# Patient Record
Sex: FEMALE | Race: BLACK OR AFRICAN AMERICAN | NOT HISPANIC OR LATINO | Employment: STUDENT | URBAN - METROPOLITAN AREA
[De-identification: names, ages, dates, MRNs, and addresses within clinical notes are randomized per-mention and may not be internally consistent; named-entity substitution may affect disease eponyms.]

---

## 2024-08-31 ENCOUNTER — HOSPITAL ENCOUNTER (EMERGENCY)
Facility: HOSPITAL | Age: 24
Discharge: HOME | End: 2024-08-31

## 2024-08-31 VITALS
OXYGEN SATURATION: 98 % | HEART RATE: 84 BPM | SYSTOLIC BLOOD PRESSURE: 112 MMHG | BODY MASS INDEX: 24.84 KG/M2 | DIASTOLIC BLOOD PRESSURE: 78 MMHG | TEMPERATURE: 97.7 F | RESPIRATION RATE: 16 BRPM | WEIGHT: 135 LBS | HEIGHT: 62 IN

## 2024-08-31 DIAGNOSIS — L23.5 ALLERGIC DERMATITIS DUE TO OTHER CHEMICAL PRODUCT: Primary | ICD-10-CM

## 2024-08-31 PROCEDURE — 2500000001 HC RX 250 WO HCPCS SELF ADMINISTERED DRUGS (ALT 637 FOR MEDICARE OP)

## 2024-08-31 PROCEDURE — 99282 EMERGENCY DEPT VISIT SF MDM: CPT

## 2024-08-31 PROCEDURE — 99284 EMERGENCY DEPT VISIT MOD MDM: CPT

## 2024-08-31 RX ORDER — DIPHENHYDRAMINE HCL 25 MG
25 TABLET ORAL EVERY 6 HOURS
Qty: 20 TABLET | Refills: 0 | Status: SHIPPED | OUTPATIENT
Start: 2024-08-31 | End: 2024-09-05

## 2024-08-31 RX ORDER — DIPHENHYDRAMINE HCL 25 MG
25 CAPSULE ORAL ONCE
Status: COMPLETED | OUTPATIENT
Start: 2024-08-31 | End: 2024-08-31

## 2024-08-31 RX ORDER — DIPHENHYDRAMINE HCL 25 MG
CAPSULE ORAL
Status: COMPLETED
Start: 2024-08-31 | End: 2024-08-31

## 2024-08-31 RX ORDER — TRIAMCINOLONE ACETONIDE 1 MG/G
1 CREAM TOPICAL 2 TIMES DAILY
Qty: 30 G | Refills: 0 | Status: SHIPPED | OUTPATIENT
Start: 2024-08-31 | End: 2024-09-07

## 2024-08-31 RX ADMIN — Medication 25 MG: at 13:21

## 2024-08-31 RX ADMIN — DIPHENHYDRAMINE HYDROCHLORIDE 25 MG: 25 CAPSULE ORAL at 13:21

## 2024-08-31 ASSESSMENT — PAIN - FUNCTIONAL ASSESSMENT: PAIN_FUNCTIONAL_ASSESSMENT: 0-10

## 2024-08-31 ASSESSMENT — COLUMBIA-SUICIDE SEVERITY RATING SCALE - C-SSRS
6. HAVE YOU EVER DONE ANYTHING, STARTED TO DO ANYTHING, OR PREPARED TO DO ANYTHING TO END YOUR LIFE?: NO
1. IN THE PAST MONTH, HAVE YOU WISHED YOU WERE DEAD OR WISHED YOU COULD GO TO SLEEP AND NOT WAKE UP?: NO
2. HAVE YOU ACTUALLY HAD ANY THOUGHTS OF KILLING YOURSELF?: NO

## 2024-08-31 ASSESSMENT — PAIN SCALES - GENERAL: PAINLEVEL_OUTOF10: 0 - NO PAIN

## 2024-08-31 ASSESSMENT — PAIN DESCRIPTION - PAIN TYPE: TYPE: ACUTE PAIN

## 2024-08-31 NOTE — ED PROVIDER NOTES
HPI    CC: Rash  HPI:   Flaca Escobar is a 23 year-old female with no reported PMHx presenting to ED with complaints of an extremely pruritic rash on bilateral forearms which she first noticed yesterday evening after getting off work. 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 States exposure to oil has made her itchy in the past but never developed a rash. Endorses wearing protective clothing and long-sleeves while handling the equipment but noticed she was extremely itchy yesterday after work. She attempted to apply alcohol to area without improvement. States rash seemingly got worse this morning, prompting her to come in for evaluation. No associated fever, chills, nausea, emesis, SOB, chest pain/pressure, cough, sore throat or trouble tolerating oral secretions. No household members with similar symptoms or exposure to new lotions, detergents, sprays, perfumes.  No new medications.      PMHx:  PSH: reviewed per EMR  Allergies: Reviewed per EMR  Medications: Reviewed per EMR  FH: Noncontributory  Social Hx: Denies tobacco. Denies EtOH, illicit substance use.      ROS: All other systems were reviewed and negative.    Physical Exam  Vitals and nursing note reviewed.   Constitutional:       General: She is not in acute distress.     Appearance: Normal appearance. She is not toxic-appearing.      Comments: No acute respiratory distress.  Tolerating oral secretions without difficulty.  Airway widely patent.   HENT:      Head: Normocephalic and atraumatic.      Mouth/Throat:      Mouth: Mucous membranes are moist.      Pharynx: Oropharynx is clear. No oropharyngeal exudate or posterior oropharyngeal erythema.   Eyes:      General: No scleral icterus.        Right eye: No discharge.         Left eye: No discharge.      Extraocular Movements: Extraocular movements intact.      Conjunctiva/sclera: Conjunctivae normal.      Pupils: Pupils are  equal, round, and reactive to light.   Cardiovascular:      Rate and Rhythm: Normal rate and regular rhythm.   Pulmonary:      Effort: Pulmonary effort is normal. No respiratory distress.      Breath sounds: Normal breath sounds. No stridor. No wheezing, rhonchi or rales.   Chest:      Chest wall: No tenderness.   Abdominal:      General: Bowel sounds are normal. There is no distension.      Palpations: Abdomen is soft.      Tenderness: There is no abdominal tenderness.   Musculoskeletal:         General: Normal range of motion.      Cervical back: Normal range of motion and neck supple.   Skin:     General: Skin is warm and dry.      Capillary Refill: Capillary refill takes less than 2 seconds.      Findings: Rash present. Rash is urticarial.             Comments: Pruritic, urticarial rash on flexural aspects of bilateral forearms with several tiny maculopapular lesions.  No ulcerations, purulent drainage, pustules or tenderness.   Neurological:      General: No focal deficit present.      Mental Status: She is alert.   Psychiatric:         Mood and Affect: Mood normal.         Behavior: Behavior normal.         GENERAL: Patient is well-appearing, cooperative. Vitals noted, NAD. Afebrile  HEAD: NC/AT.   NECK: Supple, full ROM. No lymphadenopathy.  EYES: PERRL, EOMI, anicteric sclera. No erythema or exudates.  ENMT: Hearing grossly intact. MMM, oronasopharynx is patent. Uvula midline. Normal phonation.  CV: Regular rate & rhythm. S1/S2 present. No murmur, gallops or rubs.  PULM: CTAB with normal effort. No wheezes, rales or rhonchi.  ABDOMEN: Soft, nontender, nonsurgical. No peritoneal signs. BS+ x 4. No HSM or pulsatile masses.  MSK: Full wt. bearing, Spontaneously WELSH x 4. Symmetric muscle bulk without gross step-off or deformities. No joint pain or effusions.   EXTREMITIES: WWP, 2+ bilateral RPs and DPPs. No pitting LE edema.  SKIN: Intact Cap refill <2s.  NEURO: AA&Ox3, Speech fluent. No focal deficits  identified. Normal gait. Answering questions appropriately.    -------------------------------------------------------------------------------------------  ED Course & MDM   Triage notes and vital signs were reviewed. History and physical exam were performed.I also reviewed recent and relevant outside records for thorough HPI.    Medical Decision Making    23 year-old female with no reported PMH presenting to ED with pruritic rash on bilateral forearms which first developed yesterday. Patient afebrile, hemodyanmically stable and in no acute distress. Airway is protected widely patent and she has been tolerating oral secretions without difficulty.  Physical exam notable for a urticarial rash to the flexural aspect of bilateral forearms with several tiny maculopapular lesions consistent with a contact dermatitis.  Given distribution pattern and patient's occupation as a  with recent exposure to chemical lubricants, I strongly suspect this to be due to related to allergy. No evidence of cellulitis to overlying skin at this time. No other household members with similar and no history of similar issues to raise concern for eczema. Differential included drug eruption, atopic dermatitis, folliculitis.   Given the rash is localized to flexural forearms, will prescribe topical steroid cream and Benadryl for symptomatic support. Patient instructed to wear protective clothing when handling irritants and chemicals in the future.  Advised follow-up with primary care provider on outpatient as-needed basis.  Strict ED return precautions were discussed in detail, all patient's questions were answered.  She was given dose of Benadryl prior to being discharged home in hemodynamic stable condition with prescriptions for Benadryl and topical triamcinolone cream.    Diagnoses as of 08/31/24 1326   Allergic dermatitis due to other chemical product       Disposition: Home-going  ED return precautions were discussed and  provided at time of discharge. Patient acknowledged their understanding and is amendable to the treatment plan. All discharge instructions explained to patient and all questions were answered.    Ingrid Serrano PA-C  [facility]    Disclaimer: This note was dictated by speech recognition. Minor errors in transcription may be present. Please call if questions.  -------------------------------------------------------------------------------------------      Ingrid Serrano PA-C  08/31/24 [id number]